# Patient Record
Sex: MALE | Race: BLACK OR AFRICAN AMERICAN | NOT HISPANIC OR LATINO | URBAN - METROPOLITAN AREA
[De-identification: names, ages, dates, MRNs, and addresses within clinical notes are randomized per-mention and may not be internally consistent; named-entity substitution may affect disease eponyms.]

---

## 2019-01-09 ENCOUNTER — EMERGENCY (EMERGENCY)
Facility: HOSPITAL | Age: 69
LOS: 1 days | Discharge: HOME | End: 2019-01-09
Attending: EMERGENCY MEDICINE
Payer: MEDICAID

## 2019-01-09 VITALS
HEART RATE: 87 BPM | RESPIRATION RATE: 17 BRPM | DIASTOLIC BLOOD PRESSURE: 92 MMHG | TEMPERATURE: 99 F | SYSTOLIC BLOOD PRESSURE: 159 MMHG | OXYGEN SATURATION: 100 %

## 2019-01-09 VITALS
DIASTOLIC BLOOD PRESSURE: 91 MMHG | HEIGHT: 67 IN | HEART RATE: 89 BPM | OXYGEN SATURATION: 98 % | RESPIRATION RATE: 18 BRPM | SYSTOLIC BLOOD PRESSURE: 179 MMHG | WEIGHT: 190.04 LBS | TEMPERATURE: 98 F

## 2019-01-09 LAB
ALBUMIN SERPL ELPH-MCNC: 4.5 G/DL — SIGNIFICANT CHANGE UP (ref 3.3–5)
ALP SERPL-CCNC: 73 U/L — SIGNIFICANT CHANGE UP (ref 40–120)
ALT FLD-CCNC: 12 U/L — SIGNIFICANT CHANGE UP (ref 10–45)
ANION GAP SERPL CALC-SCNC: 15 MMOL/L — SIGNIFICANT CHANGE UP (ref 5–17)
APTT BLD: 28.2 SEC — SIGNIFICANT CHANGE UP (ref 27.5–36.3)
AST SERPL-CCNC: 12 U/L — SIGNIFICANT CHANGE UP (ref 10–40)
BASOPHILS # BLD AUTO: 0.1 K/UL — SIGNIFICANT CHANGE UP (ref 0–0.2)
BASOPHILS NFR BLD AUTO: 0.9 % — SIGNIFICANT CHANGE UP (ref 0–2)
BILIRUB SERPL-MCNC: 1 MG/DL — SIGNIFICANT CHANGE UP (ref 0.2–1.2)
BUN SERPL-MCNC: 8 MG/DL — SIGNIFICANT CHANGE UP (ref 7–23)
CALCIUM SERPL-MCNC: 9.6 MG/DL — SIGNIFICANT CHANGE UP (ref 8.4–10.5)
CHLORIDE SERPL-SCNC: 96 MMOL/L — SIGNIFICANT CHANGE UP (ref 96–108)
CO2 SERPL-SCNC: 26 MMOL/L — SIGNIFICANT CHANGE UP (ref 22–31)
CREAT SERPL-MCNC: 1.22 MG/DL — SIGNIFICANT CHANGE UP (ref 0.5–1.3)
EOSINOPHIL # BLD AUTO: 0.1 K/UL — SIGNIFICANT CHANGE UP (ref 0–0.5)
EOSINOPHIL NFR BLD AUTO: 1 % — SIGNIFICANT CHANGE UP (ref 0–6)
GLUCOSE SERPL-MCNC: 308 MG/DL — HIGH (ref 70–99)
HCT VFR BLD CALC: 47.8 % — SIGNIFICANT CHANGE UP (ref 39–50)
HGB BLD-MCNC: 15.7 G/DL — SIGNIFICANT CHANGE UP (ref 13–17)
INR BLD: 1.06 RATIO — SIGNIFICANT CHANGE UP (ref 0.88–1.16)
LYMPHOCYTES # BLD AUTO: 27.8 % — SIGNIFICANT CHANGE UP (ref 13–44)
LYMPHOCYTES # BLD AUTO: 3.7 K/UL — HIGH (ref 1–3.3)
MCHC RBC-ENTMCNC: 27.2 PG — SIGNIFICANT CHANGE UP (ref 27–34)
MCHC RBC-ENTMCNC: 32.8 GM/DL — SIGNIFICANT CHANGE UP (ref 32–36)
MCV RBC AUTO: 82.9 FL — SIGNIFICANT CHANGE UP (ref 80–100)
MONOCYTES # BLD AUTO: 0.8 K/UL — SIGNIFICANT CHANGE UP (ref 0–0.9)
MONOCYTES NFR BLD AUTO: 5.8 % — SIGNIFICANT CHANGE UP (ref 2–14)
NEUTROPHILS # BLD AUTO: 8.7 K/UL — HIGH (ref 1.8–7.4)
NEUTROPHILS NFR BLD AUTO: 64.6 % — SIGNIFICANT CHANGE UP (ref 43–77)
PLATELET # BLD AUTO: 279 K/UL — SIGNIFICANT CHANGE UP (ref 150–400)
POTASSIUM SERPL-MCNC: 3.9 MMOL/L — SIGNIFICANT CHANGE UP (ref 3.5–5.3)
POTASSIUM SERPL-SCNC: 3.9 MMOL/L — SIGNIFICANT CHANGE UP (ref 3.5–5.3)
PROT SERPL-MCNC: 7.6 G/DL — SIGNIFICANT CHANGE UP (ref 6–8.3)
PROTHROM AB SERPL-ACNC: 12.1 SEC — SIGNIFICANT CHANGE UP (ref 10–12.9)
RBC # BLD: 5.76 M/UL — SIGNIFICANT CHANGE UP (ref 4.2–5.8)
RBC # FLD: 15.3 % — HIGH (ref 10.3–14.5)
SODIUM SERPL-SCNC: 137 MMOL/L — SIGNIFICANT CHANGE UP (ref 135–145)
TROPONIN T, HIGH SENSITIVITY RESULT: 16 NG/L — SIGNIFICANT CHANGE UP (ref 0–51)
WBC # BLD: 13.4 K/UL — HIGH (ref 3.8–10.5)
WBC # FLD AUTO: 13.4 K/UL — HIGH (ref 3.8–10.5)

## 2019-01-09 PROCEDURE — 93010 ELECTROCARDIOGRAM REPORT: CPT

## 2019-01-09 PROCEDURE — 99243 OFF/OP CNSLTJ NEW/EST LOW 30: CPT

## 2019-01-09 PROCEDURE — 70450 CT HEAD/BRAIN W/O DYE: CPT | Mod: 26

## 2019-01-09 PROCEDURE — 99284 EMERGENCY DEPT VISIT MOD MDM: CPT | Mod: 25

## 2019-01-09 NOTE — CONSULT NOTE ADULT - SUBJECTIVE AND OBJECTIVE BOX
Neurology Consult Note    Name  JEANCLAUDE CHARLESBOUTE    HPI:    Patient is a 68 year old male w/ history of HTN, HLD, DM bells palsy in the past coming in with right sided facial droop that he woke up with 8 days ago.  No other focal deficit.  No headaches, no neck or back pain.  No fevers or chills.  Nothing makes it better or worse.  Lives in [facility], just got here today and his family brought him to the ED.        Subjective:    Review of Systems:  Constitutional:        Eyes, Ears, Mouth, Throat:   Respiratory:                            Cardiovascular:   Gastrointestinal:                                     Genitourinary:   Musculoskeletal:                                    Dermatologic:   Neurological: as per above                                                                 Psychiatric:   Endocrine:              Hematologic/Lymphatic:     MEDICATIONS  (STANDING):    MEDICATIONS  (PRN):      Allergies    No Known Allergies    Intolerances      PAST MEDICAL & SURGICAL HISTORY:  Diabetes  HTN (hypertension)  Stroke    FH:  SH:    Objective:   Vital Signs Last 24 Hrs  T(C): 36.9 (09 Jan 2019 21:07), Max: 36.9 (09 Jan 2019 21:07)  T(F): 98.4 (09 Jan 2019 21:07), Max: 98.4 (09 Jan 2019 21:07)  HR: 89 (09 Jan 2019 19:40) (89 - 89)  BP: 176/83 (09 Jan 2019 21:07) (176/83 - 179/91)  BP(mean): --  RR: 17 (09 Jan 2019 21:07) (17 - 18)  SpO2: 98% (09 Jan 2019 21:07) (98% - 98%)    General Exam:   General appearance: No acute distress                   Neurological Exam:  Mental Status: Orientated to self, date and place.  Attention intact.  No dysarthria, aphasia or neglect.  Knowledge intact.  Registration intact.  Short and long term memory grossly intact.      Cranial Nerves:  PERRL, EOMI, VFF, no nystagmus or diplopia.  CN V1-3 intact to light touch and pinprick.  R facial droop, decreased eyelid close on R, Hearing intact to finger rub bilaterally.  Tongue, uvula and palate midline.  Sternocleidomastoid and Trapezius intact bilaterally.    Motor:   Tone: normal.                  Strength:     [] Upper extremity                      Delt       Bicep    Tricep                                                  R         5/5        5/5        5/5       5/5                                               L          5/5        5/5        5/5       5/5  [] Lower extremity                       HF          KE          KF        DF         PF                                               R        5/5        5/5        5/5       5/5       5/5                                               L         5/5        5/5       5/5       5/5        5/5  Pronator drift: none                 Dysmetria: None to finger-nose-fingerl    Sensation: grossly intact to light touch    Deep Tendon Reflexes: 1+ bilateral biceps, triceps, brachioradialis, knee and ankle                        15.7   13.4  )-----------( 279      ( 09 Jan 2019 21:14 )             47.8     01-09    137  |  96  |  8   ----------------------------<  308<H>  3.9   |  26  |  1.22    Ca    9.6      09 Jan 2019 21:14    TPro  7.6  /  Alb  4.5  /  TBili  1.0  /  DBili  x   /  AST  12  /  ALT  12  /  AlkPhos  73  01-09    LIVER FUNCTIONS - ( 09 Jan 2019 21:14 )  Alb: 4.5 g/dL / Pro: 7.6 g/dL / ALK PHOS: 73 U/L / ALT: 12 U/L / AST: 12 U/L / GGT: x           PT/INR - ( 09 Jan 2019 21:14 )   PT: 12.1 sec;   INR: 1.06 ratio         PTT - ( 09 Jan 2019 21:14 )  PTT:28.2 sec    Radiology

## 2019-01-09 NOTE — ED PROVIDER NOTE - NSFOLLOWUPINSTRUCTIONS_ED_ALL_ED_FT
Stay well hydrated. Take Valrex and prednisone as prescribed.  Follow-up with your neurology this week- info attached.   Bring a copy of your results with you  Return to an ER for worsening symptoms or any other concerns.

## 2019-01-09 NOTE — ED ADULT NURSE NOTE - OBJECTIVE STATEMENT
68 yr old male [place] (returns from [facility]) for c/o L facial droop x 8 days, hx cva x 2 yrs ago, hx htn/diabetes, compliant with meds, did not seek medical attention at onset of symptoms, at present clear lungs, vitals stable, +L facial droop, eloy perrl 3 mm, clear speech, maex4: strong, sensation intact, follows all commands, +baseline mentation: A&Ox3, ambulatory: steady gait

## 2019-01-09 NOTE — ED PROVIDER NOTE - NSFOLLOWUPCLINICS_GEN_ALL_ED_FT
[facility]  Neurology  [address] [place] [address] [address] [place] [address]  Phone: [phone]  Fax:   Follow Up Time:

## 2019-01-09 NOTE — ED ADULT NURSE NOTE - NSIMPLEMENTINTERV_GEN_ALL_ED
Implemented All Universal Safety Interventions:  [place] to call system. Call bell, personal items and telephone within reach. Instruct patient to call for assistance. Room bathroom lighting operational. Non-slip footwear when patient is off stretcher. Physically safe environment: no spills, clutter or unnecessary equipment. Stretcher in lowest position, wheels locked, appropriate side rails in place.

## 2019-01-09 NOTE — ED ADULT TRIAGE NOTE - CHIEF COMPLAINT QUOTE
R sided facial droop started 8 days ago while pt was in [facility]; [place]

## 2019-01-09 NOTE — ED ADULT NURSE NOTE - CHIEF COMPLAINT QUOTE
R sided facial droop started 8 days ago while pt was in [facility]; [place] today

## 2019-01-09 NOTE — ED ADULT NURSE REASSESSMENT NOTE - NS ED NURSE REASSESS COMMENT FT1
eloy perrl 3 mm, R facial droop/asymmetry, vitals stable, denies c/o, +passed dysphagia screen, maex4: strong, head CT results pending

## 2019-01-09 NOTE — ED PROVIDER NOTE - NEUROLOGICAL, MLM
+ right sided facial droop however the forehead appears to be spared.  The remainder of the cranial nerve exam was normal.  Normal coordination with finger to nose and heel to shin

## 2019-01-09 NOTE — ED PROVIDER NOTE - ATTENDING CONTRIBUTION TO CARE
attending Shirlene: 68yM h/o HTN, HLD, DM, remote bells palsy p/w R sided facial droop x 8 days. Denies other focal deficit. On exam, well-appearing, R lower facial droop with incomplete closure of R eye and ?partial raising of R eyebrow. Likely Bell's but with somewhat preserved forehead wrinkling (although with incomplete R eye closure). Will obtain CT head, neuro consult and reassess

## 2019-01-09 NOTE — ED PROVIDER NOTE - OBJECTIVE STATEMENT
68 y.o. male history of HTN, HLD, DM bells palsy in the past coming in with right sided facial droop that he woke up with 8 days ago.  No other focal deficit.  No headaches, no neck or back pain.  No fevers or chills.  Nothing makes it better or worse.  Lives in [facility], just got here today and his family brought him to the ED.

## 2019-01-09 NOTE — CONSULT NOTE ADULT - ASSESSMENT
68 year old male w/ history of HTN, HLD, DM bells palsy in the past coming in with right sided facial droop that he woke up with 8 days ago.  Neurological exam significant for R facial droop w/ decreased R eyelid closure.  CTH pending read, grossly no acute abnormalities.  Symptoms and presentation most consistent w/ Bell's palsy.    Plan:  - prednisone 80mg x 7 days  - valacyclovir 1000mg TID x 7 days  - f/u w/ outpatient neurology clinic [phone] for further management

## 2019-01-10 PROCEDURE — 70450 CT HEAD/BRAIN W/O DYE: CPT

## 2019-01-10 PROCEDURE — 93005 ELECTROCARDIOGRAM TRACING: CPT

## 2019-01-10 PROCEDURE — 84484 ASSAY OF TROPONIN QUANT: CPT

## 2019-01-10 PROCEDURE — 80053 COMPREHEN METABOLIC PANEL: CPT

## 2019-01-10 PROCEDURE — 85027 COMPLETE CBC AUTOMATED: CPT

## 2019-01-10 PROCEDURE — 99284 EMERGENCY DEPT VISIT MOD MDM: CPT | Mod: 25

## 2019-01-10 PROCEDURE — 85730 THROMBOPLASTIN TIME PARTIAL: CPT

## 2019-01-10 PROCEDURE — 85610 PROTHROMBIN TIME: CPT

## 2019-01-10 RX ORDER — VALACYCLOVIR 500 MG/1
1 TABLET, FILM COATED ORAL
Qty: 21 | Refills: 0 | OUTPATIENT
Start: 2019-01-10 | End: 2019-01-16

## 2019-01-10 RX ORDER — VALACYCLOVIR 500 MG/1
1000 TABLET, FILM COATED ORAL ONCE
Qty: 0 | Refills: 0 | Status: COMPLETED | OUTPATIENT
Start: 2019-01-10 | End: 2019-01-10

## 2019-01-10 RX ADMIN — VALACYCLOVIR 1000 MILLIGRAM(S): 500 TABLET, FILM COATED ORAL at 00:16

## 2019-01-10 RX ADMIN — Medication 80 MILLIGRAM(S): at 00:16

## 2019-06-01 NOTE — ED PROVIDER NOTE - PRINCIPAL DIAGNOSIS
for recall of 3 unrelated words, was able to recall 3/3 immediately, but only recalled 2/3 after 2-5 min delays despite rehearsal, improved with semantic/category cues/impaired Bell's palsy

## 2020-05-29 NOTE — ED ADULT TRIAGE NOTE - NS ED NURSE BANDS TYPE
Suggest he stop atorvastatin for four weeks and then re-start.  If the stiffness and aches in the lower extremities go away and then come back, message me and we'll talk about statin.      Call [phone] or [phone] to schedule your CT scan.    Get your B12 and thyroid tests (not fasting) soon and tell the lab you are there only for those two tests.      If these tests are all normal, message me, and I will set up a referral to a neurologist for the balance problem.               Increased creatinine - Is taking Alleve twice daily and is on lisinopril.  Stop Alleve.    Use Tylenol for pain  
Name band;

## 2024-05-17 NOTE — ED PROVIDER NOTE - CROS ED CONS ALL NEG
CC:  Chaitanya Ruiz is here today for Establish Care and Physical       Medications have been reviewed and updated and Medications have been requested to be refilled and have been ordered and pended for clinician's signature    Patient preferred phone number: [phone]  Ok to leave detailed message on [employer]       Health Maintenance       Depression Screening (Yearly)  Overdue since 8/12/2023    Annual Physical (ages 3-18) (Yearly)  Overdue since 8/12/2023    COVID-19 Vaccine (3 - 2023-24 season)  Overdue since 9/1/2023           Following review of the above:  Patient is not proceeding with: COVID-19    Note: Refer to final orders and clinician documentation.            Review Flowsheet  More data exists         5/17/2024   PHQ 2/9 Score   Adult PHQ 2 Score 0   Adult PHQ 2 Interpretation No further screening needed   Little interest or pleasure in activity? Not at all   Feeling down, depressed or hopeless? Not at all                   
0
negative...